# Patient Record
Sex: FEMALE | Race: OTHER | HISPANIC OR LATINO | URBAN - METROPOLITAN AREA
[De-identification: names, ages, dates, MRNs, and addresses within clinical notes are randomized per-mention and may not be internally consistent; named-entity substitution may affect disease eponyms.]

---

## 2020-06-17 ENCOUNTER — OFFICE VISIT (OUTPATIENT)
Dept: URBAN - METROPOLITAN AREA CLINIC 115 | Facility: CLINIC | Age: 65
End: 2020-06-17

## 2020-08-17 ENCOUNTER — TELEPHONE ENCOUNTER (OUTPATIENT)
Dept: URBAN - METROPOLITAN AREA CLINIC 115 | Facility: CLINIC | Age: 65
End: 2020-08-17

## 2020-09-02 ENCOUNTER — OFFICE VISIT (OUTPATIENT)
Dept: URBAN - METROPOLITAN AREA TELEHEALTH 2 | Facility: TELEHEALTH | Age: 65
End: 2020-09-02
Payer: COMMERCIAL

## 2020-09-02 DIAGNOSIS — K26.9 DUODENAL ULCER: ICD-10-CM

## 2020-09-02 DIAGNOSIS — C24.1 AMPULLARY CARCINOMA: ICD-10-CM

## 2020-09-02 DIAGNOSIS — K83.1 AMPULLARY STENOSIS: ICD-10-CM

## 2020-09-02 DIAGNOSIS — R10.13 ABDOMINAL PAIN, EPIGASTRIC: ICD-10-CM

## 2020-09-02 PROCEDURE — G8427 DOCREV CUR MEDS BY ELIG CLIN: HCPCS | Performed by: INTERNAL MEDICINE

## 2020-09-02 PROCEDURE — 1036F TOBACCO NON-USER: CPT | Performed by: INTERNAL MEDICINE

## 2020-09-02 PROCEDURE — G9903 PT SCRN TBCO ID AS NON USER: HCPCS | Performed by: INTERNAL MEDICINE

## 2020-09-02 PROCEDURE — G8420 CALC BMI NORM PARAMETERS: HCPCS | Performed by: INTERNAL MEDICINE

## 2020-09-02 PROCEDURE — 99214 OFFICE O/P EST MOD 30 MIN: CPT | Performed by: INTERNAL MEDICINE

## 2020-09-02 PROCEDURE — 3017F COLORECTAL CA SCREEN DOC REV: CPT | Performed by: INTERNAL MEDICINE

## 2020-09-02 RX ORDER — PANTOPRAZOLE SODIUM 40 MG/1
1 TABLET TABLET, DELAYED RELEASE ORAL ONCE A DAY
Qty: 90 | Refills: 1 | OUTPATIENT
Start: 2020-09-02

## 2020-09-02 RX ORDER — DICYCLOMINE HYDROCHLORIDE 10 MG/1
1 TABLET CAPSULE ORAL THREE TIMES A DAY
Qty: 90 | Refills: 1 | OUTPATIENT
Start: 2020-09-02 | End: 2020-11-01

## 2020-09-02 NOTE — HPI-OTHER HISTORIES
The patient is a 62 year old,  or  female , who presents for follow up for CBD stricture. abdominal pain. A copy of this document will be sent to the referring provider.  The patient has described the pain as moderate and throbbing occurring constantly and after meals, and lasts hours at a time. The pain has remained unchanged since it started. The location of the pain is in the upper abdomen and has been present for the past off and on for 1 week. The pain is improved by no particular treatment and is aggravated by eating. The pain is reproducible with palpation over the area.  Patient has a history of constipation and heartburn.  The patient admits to feeling bloated.       She was admitted at [facility] from 12/29-1/3 for abdominal pain and elevated Lfts, CT scan and MRI showed dilated CBD with out any CBD stones. ERCP showed ampullay strictutre, 10x12 fr stent was placed and brushings showed atypical cells but favor reactive and ulceration of the ampulla. She deneis any fever and chills but c/o abdominal pain with eating ,bloating and c/o weight loss about 10 lbs in past 6 weeks. She c/o pain LLQ worse in the past 2 days .  03/01/2018 EUS done on 2/7/18 and biopsy showed atypical cells of the ampulla. Thickened ampulla noted, suspicious for ampullary cancer.  Patient is feeling slightly better. Admits to constipation. She has having lower torso pain. Feels a burning pain when urinating. Complaining of LLQ pain. Denies jaundice and weight loss. Plastic stent in bile duct.  3/22/2019 She had pylorous preserving whipple procedure by Dr. Keenan on 3/27/2018 for ampullary mass. She had no issues after the procedure, but she constantly feels abdominal pain, for the past 7 months. She complains of constipation, she has a burning sensation inside. States she has a bowel movement every 3rd day. She has been eating, but doesn't gain weight. She has bloating and swelling. She had a CT scan in January. Denies melena or hematochezia. Does complain of weight loss.  5/10/2019 EGD done on 4/9/2019 showed a 2 cm non-bleeding ulcer in duodenal bulb and gastritis in the antrum of the stomach. Biopsy was benign. Colonoscopy done on 4/9/2019 showed internal hemorrhoids. She still complains of abdominal pain, no bloating. She denies any NSAID's use. She is currently taking Protonix. She denies any melena or hematochezia. Symptoms improved, occasional epigastric pain.  8/8/2019 CT scan done on 8/6/2019 was normal, no cancer noted. Labs were normal. Patient states she still has burning sensation in the abdomen. She states abdominal pain improved. She states she never took Bentyl. She is currently on Protonix. She denies any constipation or loose bowels. Denies any melena or hematochezia. Improved, but still has occasional abdominal pain. No weight loss.  10/31/2019 Patient was seen by Dr. 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  Patient complains of occasional abdominal pain. She states she is eating well. Denies any other issues, no jaundice. She has an upcoming CT scan in January. No weight loss. No new symptoms.  03/18/2020 Patient is doing fine, but she is complaining of abdominal pain. She states the pain is more constant now. She had an CT done last week at [facility]. CT showed no signs of ulcer or recurrent malignancy. She complains of constipation. She has bowel movements every three days, along with symptoms of bloating. She also complains of itching. Denies any jaundice.

## 2020-09-02 NOTE — HPI-TODAY'S VISIT:
09/02/2020 Patient was seen for a Telehealth visit. 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 She complains of abdominal pain 2 weeks ago. She denies any constipation. She is having bowel movements every other day. She states abdominal pain improved but it is still present, not as severe as 2 weeks ago. She started pantoprazole and dicyclomine since the last visit. She states dicyclomine helped while she was taking and has been unable to get a refill since March. EGD done on 4/9/2019 showed duodenal bulb ulcer, colonoscopy done on 4/9/2019 showed internal hemorrhoids. CT scan done 08/10/2020 showed lymph nodes around the stomach. She previously had a Whipple's procedure, and underwent surgery 2 years ago. She has seen the oncologist, was adviseed that the masses were small and unable to be biopsied. She had stent placed in the bile duct also. She has an appointment with Dr. Rojo in October, along with a repeat CT scan prior to that office visit.

## 2020-09-08 ENCOUNTER — TELEPHONE ENCOUNTER (OUTPATIENT)
Dept: URBAN - METROPOLITAN AREA CLINIC 115 | Facility: CLINIC | Age: 65
End: 2020-09-08

## 2020-09-22 ENCOUNTER — OFFICE VISIT (OUTPATIENT)
Dept: URBAN - METROPOLITAN AREA MEDICAL CENTER 24 | Facility: MEDICAL CENTER | Age: 65
End: 2020-09-22
Payer: COMMERCIAL

## 2020-09-22 DIAGNOSIS — K29.60 ADENOPAPILLOMATOSIS GASTRICA: ICD-10-CM

## 2020-09-22 DIAGNOSIS — K28.9 ANASTOMOTIC ULCER: ICD-10-CM

## 2020-09-22 DIAGNOSIS — R59.0 ABDOMINAL LYMPHADENOPATHY: ICD-10-CM

## 2020-09-22 PROCEDURE — 43239 EGD BIOPSY SINGLE/MULTIPLE: CPT | Performed by: INTERNAL MEDICINE

## 2020-09-22 PROCEDURE — 43238 EGD US FINE NEEDLE BX/ASPIR: CPT | Performed by: INTERNAL MEDICINE

## 2020-10-15 ENCOUNTER — OFFICE VISIT (OUTPATIENT)
Dept: URBAN - METROPOLITAN AREA CLINIC 115 | Facility: CLINIC | Age: 65
End: 2020-10-15
Payer: COMMERCIAL

## 2020-10-15 ENCOUNTER — WEB ENCOUNTER (OUTPATIENT)
Dept: URBAN - METROPOLITAN AREA CLINIC 115 | Facility: CLINIC | Age: 65
End: 2020-10-15

## 2020-10-15 DIAGNOSIS — K83.1 AMPULLARY STENOSIS: ICD-10-CM

## 2020-10-15 DIAGNOSIS — C24.1 AMPULLARY CARCINOMA: ICD-10-CM

## 2020-10-15 DIAGNOSIS — R10.13 ABDOMINAL PAIN, EPIGASTRIC: ICD-10-CM

## 2020-10-15 DIAGNOSIS — K26.9 DUODENAL ULCER: ICD-10-CM

## 2020-10-15 DIAGNOSIS — K83.8 DILATED CBD, ACQUIRED: ICD-10-CM

## 2020-10-15 PROCEDURE — G8427 DOCREV CUR MEDS BY ELIG CLIN: HCPCS | Performed by: INTERNAL MEDICINE

## 2020-10-15 PROCEDURE — G8420 CALC BMI NORM PARAMETERS: HCPCS | Performed by: INTERNAL MEDICINE

## 2020-10-15 PROCEDURE — 99214 OFFICE O/P EST MOD 30 MIN: CPT | Performed by: INTERNAL MEDICINE

## 2020-10-15 PROCEDURE — 1036F TOBACCO NON-USER: CPT | Performed by: INTERNAL MEDICINE

## 2020-10-15 RX ORDER — PANTOPRAZOLE SODIUM 40 MG/1
1 TABLET TABLET, DELAYED RELEASE ORAL ONCE A DAY
Qty: 90 | Refills: 1 | OUTPATIENT
Start: 2020-10-15

## 2020-10-15 RX ORDER — DICYCLOMINE HYDROCHLORIDE 10 MG/1
1 TABLET CAPSULE ORAL THREE TIMES A DAY
Qty: 90 | Refills: 1 | Status: ACTIVE | COMMUNITY
Start: 2020-09-02 | End: 2020-11-01

## 2020-10-15 RX ORDER — DICYCLOMINE HYDROCHLORIDE 10 MG/1
1 TABLET CAPSULE ORAL THREE TIMES A DAY
Qty: 270 TABLET | Refills: 1 | OUTPATIENT
Start: 2020-10-15 | End: 2021-04-13

## 2020-10-15 RX ORDER — PANTOPRAZOLE SODIUM 40 MG/1
1 TABLET TABLET, DELAYED RELEASE ORAL ONCE A DAY
Qty: 90 | Refills: 1 | Status: ACTIVE | COMMUNITY
Start: 2020-09-02

## 2020-10-15 NOTE — HPI-OTHER HISTORIES
The patient is a 62 year old,  or  female , who presents for follow up for CBD stricture. abdominal pain. A copy of this document will be sent to the referring provider.  The patient has described the pain as moderate and throbbing occurring constantly and after meals, and lasts hours at a time. The pain has remained unchanged since it started. The location of the pain is in the upper abdomen and has been present for the past off and on for 1 week. The pain is improved by no particular treatment and is aggravated by eating. The pain is reproducible with palpation over the area.  Patient has a history of constipation and heartburn.  The patient admits to feeling bloated.       She was admitted at [facility] from 12/29-1/3 for abdominal pain and elevated Lfts, CT scan and MRI showed dilated CBD with out any CBD stones. ERCP showed ampullay strictutre, 10x12 fr stent was placed and brushings showed atypical cells but favor reactive and ulceration of the ampulla. She deneis any fever and chills but c/o abdominal pain with eating ,bloating and c/o weight loss about 10 lbs in past 6 weeks. She c/o pain LLQ worse in the past 2 days .  03/01/2018 EUS done on 2/7/18 and biopsy showed atypical cells of the ampulla. Thickened ampulla noted, suspicious for ampullary cancer.  Patient is feeling slightly better. Admits to constipation. She has having lower torso pain. Feels a burning pain when urinating. Complaining of LLQ pain. Denies jaundice and weight loss. Plastic stent in bile duct.  3/22/2019 She had pylorous preserving whipple procedure by Dr. Keenan on 3/27/2018 for ampullary mass. She had no issues after the procedure, but she constantly feels abdominal pain, for the past 7 months. She complains of constipation, she has a burning sensation inside. States she has a bowel movement every 3rd day. She has been eating, but doesn't gain weight. She has bloating and swelling. She had a CT scan in January. Denies melena or hematochezia. Does complain of weight loss.  5/10/2019 EGD done on 4/9/2019 showed a 2 cm non-bleeding ulcer in duodenal bulb and gastritis in the antrum of the stomach. Biopsy was benign. Colonoscopy done on 4/9/2019 showed internal hemorrhoids. She still complains of abdominal pain, no bloating. She denies any NSAID's use. She is currently taking Protonix. She denies any melena or hematochezia. Symptoms improved, occasional epigastric pain.  8/8/2019 CT scan done on 8/6/2019 was normal, no cancer noted. Labs were normal. Patient states she still has burning sensation in the abdomen. She states abdominal pain improved. She states she never took Bentyl. She is currently on Protonix. She denies any constipation or loose bowels. Denies any melena or hematochezia. Improved, but still has occasional abdominal pain. No weight loss.  10/31/2019 Patient was seen by Dr. 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  Patient complains of occasional abdominal pain. She states she is eating well. Denies any other issues, no jaundice. She has an upcoming CT scan in January. No weight loss. No new symptoms.  03/18/2020 Patient is doing fine, but she is complaining of abdominal pain. She states the pain is more constant now. She had an CT done last week at [facility]. CT showed no signs of ulcer or recurrent malignancy. She complains of constipation. She has bowel movements every three days, along with symptoms of bloating. She also complains of itching. Denies any jaundice.

## 2020-12-10 ENCOUNTER — OFFICE VISIT (OUTPATIENT)
Dept: URBAN - METROPOLITAN AREA CLINIC 115 | Facility: CLINIC | Age: 65
End: 2020-12-10

## 2020-12-10 RX ORDER — DICYCLOMINE HYDROCHLORIDE 10 MG/1
1 TABLET CAPSULE ORAL THREE TIMES A DAY
Qty: 270 TABLET | Refills: 1 | COMMUNITY
Start: 2020-10-15 | End: 2021-04-13

## 2020-12-10 RX ORDER — PANTOPRAZOLE SODIUM 40 MG/1
1 TABLET TABLET, DELAYED RELEASE ORAL ONCE A DAY
Qty: 90 | Refills: 1 | COMMUNITY
Start: 2020-09-02

## 2020-12-10 RX ORDER — PANTOPRAZOLE SODIUM 40 MG/1
1 TABLET TABLET, DELAYED RELEASE ORAL ONCE A DAY
Qty: 90 | Refills: 1 | COMMUNITY
Start: 2020-10-15

## 2021-01-07 ENCOUNTER — OFFICE VISIT (OUTPATIENT)
Dept: URBAN - METROPOLITAN AREA CLINIC 115 | Facility: CLINIC | Age: 66
End: 2021-01-07

## 2021-01-21 ENCOUNTER — OFFICE VISIT (OUTPATIENT)
Dept: URBAN - METROPOLITAN AREA CLINIC 115 | Facility: CLINIC | Age: 66
End: 2021-01-21
Payer: COMMERCIAL

## 2021-01-21 ENCOUNTER — OFFICE VISIT (OUTPATIENT)
Dept: URBAN - METROPOLITAN AREA CLINIC 115 | Facility: CLINIC | Age: 66
End: 2021-01-21

## 2021-01-21 ENCOUNTER — DASHBOARD ENCOUNTERS (OUTPATIENT)
Age: 66
End: 2021-01-21

## 2021-01-21 DIAGNOSIS — R10.13 ABDOMINAL PAIN, EPIGASTRIC: ICD-10-CM

## 2021-01-21 DIAGNOSIS — C24.1 AMPULLARY CARCINOMA: ICD-10-CM

## 2021-01-21 DIAGNOSIS — K83.1 AMPULLARY STENOSIS: ICD-10-CM

## 2021-01-21 DIAGNOSIS — K83.8 DILATED CBD, ACQUIRED: ICD-10-CM

## 2021-01-21 DIAGNOSIS — K26.9 DUODENAL ULCER: ICD-10-CM

## 2021-01-21 DIAGNOSIS — R93.89 ABNORMAL CT SCAN: ICD-10-CM

## 2021-01-21 PROBLEM — 30144000 OBSTRUCTION OF BILE DUCT: Status: ACTIVE | Noted: 2020-09-02

## 2021-01-21 PROBLEM — 129679001: Status: ACTIVE | Noted: 2021-01-21

## 2021-01-21 PROCEDURE — 99214 OFFICE O/P EST MOD 30 MIN: CPT | Performed by: INTERNAL MEDICINE

## 2021-01-21 PROCEDURE — G8418 CALC BMI BLW LOW PARAM F/U: HCPCS | Performed by: INTERNAL MEDICINE

## 2021-01-21 PROCEDURE — G8427 DOCREV CUR MEDS BY ELIG CLIN: HCPCS | Performed by: INTERNAL MEDICINE

## 2021-01-21 PROCEDURE — G9903 PT SCRN TBCO ID AS NON USER: HCPCS | Performed by: INTERNAL MEDICINE

## 2021-01-21 RX ORDER — PANTOPRAZOLE SODIUM 40 MG/1
1 TABLET TABLET, DELAYED RELEASE ORAL ONCE A DAY
Qty: 90 | Refills: 1 | COMMUNITY
Start: 2020-09-02

## 2021-01-21 RX ORDER — DICYCLOMINE HYDROCHLORIDE 10 MG/1
1 TABLET CAPSULE ORAL THREE TIMES A DAY
Qty: 270 TABLET | Refills: 1 | COMMUNITY
Start: 2020-10-15 | End: 2021-04-13

## 2021-01-21 RX ORDER — PANTOPRAZOLE SODIUM 40 MG/1
1 TABLET TABLET, DELAYED RELEASE ORAL ONCE A DAY
Qty: 90 | Refills: 1 | COMMUNITY
Start: 2020-10-15

## 2021-01-21 NOTE — HPI-OTHER HISTORIES
The patient is a 62 year old,  or  female , who presents for follow up for CBD stricture. abdominal pain. A copy of this document will be sent to the referring provider.  The patient has described the pain as moderate and throbbing occurring constantly and after meals, and lasts hours at a time. The pain has remained unchanged since it started. The location of the pain is in the upper abdomen and has been present for the past off and on for 1 week. The pain is improved by no particular treatment and is aggravated by eating. The pain is reproducible with palpation over the area.  Patient has a history of constipation and heartburn.  The patient admits to feeling bloated.       She was admitted at [facility] from 12/29-1/3 for abdominal pain and elevated Lfts, CT scan and MRI showed dilated CBD with out any CBD stones. ERCP showed ampullay strictutre, 10x12 fr stent was placed and brushings showed atypical cells but favor reactive and ulceration of the ampulla. She deneis any fever and chills but c/o abdominal pain with eating ,bloating and c/o weight loss about 10 lbs in past 6 weeks. She c/o pain LLQ worse in the past 2 days .  03/01/2018 EUS done on 2/7/18 and biopsy showed atypical cells of the ampulla. Thickened ampulla noted, suspicious for ampullary cancer.  Patient is feeling slightly better. Admits to constipation. She has having lower torso pain. Feels a burning pain when urinating. Complaining of LLQ pain. Denies jaundice and weight loss. Plastic stent in bile duct.  3/22/2019 She had pylorous preserving whipple procedure by Dr. Keenan on 3/27/2018 for ampullary mass. She had no issues after the procedure, but she constantly feels abdominal pain, for the past 7 months. She complains of constipation, she has a burning sensation inside. States she has a bowel movement every 3rd day. She has been eating, but doesn't gain weight. She has bloating and swelling. She had a CT scan in January. Denies melena or hematochezia. Does complain of weight loss.  5/10/2019 EGD done on 4/9/2019 showed a 2 cm non-bleeding ulcer in duodenal bulb and gastritis in the antrum of the stomach. Biopsy was benign. Colonoscopy done on 4/9/2019 showed internal hemorrhoids. She still complains of abdominal pain, no bloating. She denies any NSAID's use. She is currently taking Protonix. She denies any melena or hematochezia. Symptoms improved, occasional epigastric pain.  8/8/2019 CT scan done on 8/6/2019 was normal, no cancer noted. Labs were normal. Patient states she still has burning sensation in the abdomen. She states abdominal pain improved. She states she never took Bentyl. She is currently on Protonix. She denies any constipation or loose bowels. Denies any melena or hematochezia. Improved, but still has occasional abdominal pain. No weight loss.  10/31/2019 Patient was seen by Dr. 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  Patient complains of occasional abdominal pain. She states she is eating well. Denies any other issues, no jaundice. She has an upcoming CT scan in January. No weight loss. No new symptoms.  03/18/2020 Patient is doing fine, but she is complaining of abdominal pain. She states the pain is more constant now. She had an CT done last week at [facility] [place]. CT showed no signs of ulcer or recurrent malignancy. She complains of constipation. She has bowel movements every three days, along with symptoms of bloating. She also complains of itching. Denies any jaundice.

## 2021-01-21 NOTE — HPI-TODAY'S VISIT:
09/02/2020 Patient was seen for a Telehealth visit. 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 She complains of abdominal pain 2 weeks ago. She denies any constipation. She is having bowel movements every other day. She states abdominal pain improved but it is still present, not as severe as 2 weeks ago. She started pantoprazole and dicyclomine since the last visit. She states dicyclomine helped while she was taking and has been unable to get a refill since March. EGD done on 4/9/2019 showed duodenal bulb ulcer, colonoscopy done on 4/9/2019 showed internal hemorrhoids. CT scan done 08/10/2020 showed lymph nodes around the stomach. She previously had a Whipple's procedure, and underwent surgery 2 years ago. She has seen the oncologist, was adviseed that the masses were small and unable to be biopsied. She had stent placed in the bile duct also. She has an appointment with Dr. Rojo in October, along with a repeat CT scan prior to that office visit.  10/15/2020 Patient presents for a follow up office visit. Patient states that she feels better with the medication. She will have CT scan done on Monday. Patient is concerned that ulcers will come back. EUS/EGD on 09/22/2020 showed 1 cm cratered ulcer. Stomach biopsy showed gastritis, benign no cancer. Denies any melena or hematochezia.   01/21/2021  Patient presents for f/u office visit. EGD on 09/22/2020 showed anastomotic ulcer. Biopsies were benign.  CT scan of abdomen on 09/02/2020 showed disease progression and enlarged lymph nodes in lungs and lung metastasis. EUS on 09/22/2020 showed anastomotic ulcer and lymph nodes in the celiac axis. Celiac axis lymph nodes were negative for malignancy on biopsies.   She is currently not undergoing chemotherapy, she was recommended that it was not necessary after her surgery. She had another recent abdominal CT scan with Dr. Garcia at [facility] [place] for LUQ and left sided pain done last week, results showing disease progressing. She still takes omeprazole. Her stomach pain has decreased over time, but she is still experiencing burning and discomfort when she eats. She previously took dicyclomine which improved her symptoms but she has since run out. No constipation, improved from before. She feels some pain and bloating in the epigastric area and left side.

## 2021-08-25 ENCOUNTER — OUT OF OFFICE VISIT (OUTPATIENT)
Dept: URBAN - METROPOLITAN AREA MEDICAL CENTER 24 | Facility: MEDICAL CENTER | Age: 66
End: 2021-08-25
Payer: COMMERCIAL

## 2021-08-25 DIAGNOSIS — K28.9 ANASTOMOTIC ULCER: ICD-10-CM

## 2021-08-25 DIAGNOSIS — K28.4 CHRONIC OR UNSPECIFIED GASTROJEJUNAL ULCER WITH HEMORRHAGE: ICD-10-CM

## 2021-08-25 DIAGNOSIS — C24.1 AMPULLARY CARCINOMA: ICD-10-CM

## 2021-08-25 DIAGNOSIS — K92.1 ACUTE MELENA: ICD-10-CM

## 2021-08-25 DIAGNOSIS — K92.0 BLOODY EMESIS: ICD-10-CM

## 2021-08-25 PROCEDURE — G8427 DOCREV CUR MEDS BY ELIG CLIN: HCPCS | Performed by: INTERNAL MEDICINE

## 2021-08-25 PROCEDURE — 99232 SBSQ HOSP IP/OBS MODERATE 35: CPT | Performed by: INTERNAL MEDICINE

## 2021-08-25 PROCEDURE — 99222 1ST HOSP IP/OBS MODERATE 55: CPT | Performed by: INTERNAL MEDICINE

## 2021-08-25 PROCEDURE — 43255 EGD CONTROL BLEEDING ANY: CPT | Performed by: INTERNAL MEDICINE

## 2021-08-26 ENCOUNTER — OFFICE VISIT (OUTPATIENT)
Dept: URBAN - METROPOLITAN AREA CLINIC 82 | Facility: CLINIC | Age: 66
End: 2021-08-26

## 2021-08-27 ENCOUNTER — OUT OF OFFICE VISIT (OUTPATIENT)
Dept: URBAN - METROPOLITAN AREA MEDICAL CENTER 24 | Facility: MEDICAL CENTER | Age: 66
End: 2021-08-27
Payer: COMMERCIAL

## 2021-08-27 DIAGNOSIS — D62 ACUTE BLOOD LOSS ANEMIA: ICD-10-CM

## 2021-08-27 DIAGNOSIS — I95.9 HYPOTENSION: ICD-10-CM

## 2021-08-27 DIAGNOSIS — K92.1 ACUTE MELENA: ICD-10-CM

## 2021-08-27 PROCEDURE — 99232 SBSQ HOSP IP/OBS MODERATE 35: CPT | Performed by: INTERNAL MEDICINE

## 2021-08-28 ENCOUNTER — OUT OF OFFICE VISIT (OUTPATIENT)
Dept: URBAN - METROPOLITAN AREA MEDICAL CENTER 24 | Facility: MEDICAL CENTER | Age: 66
End: 2021-08-28
Payer: COMMERCIAL

## 2021-08-28 DIAGNOSIS — K28.9 ANASTOMOTIC ULCER: ICD-10-CM

## 2021-08-28 DIAGNOSIS — K92.0 BLOODY EMESIS: ICD-10-CM

## 2021-08-28 DIAGNOSIS — C24.1 AMPULLARY CARCINOMA: ICD-10-CM

## 2021-08-28 DIAGNOSIS — K92.1 ACUTE MELENA: ICD-10-CM

## 2021-08-28 PROCEDURE — 99232 SBSQ HOSP IP/OBS MODERATE 35: CPT | Performed by: INTERNAL MEDICINE

## 2021-08-31 ENCOUNTER — OUT OF OFFICE VISIT (OUTPATIENT)
Dept: URBAN - METROPOLITAN AREA MEDICAL CENTER 24 | Facility: MEDICAL CENTER | Age: 66
End: 2021-08-31
Payer: COMMERCIAL

## 2021-08-31 DIAGNOSIS — K92.1 ACUTE MELENA: ICD-10-CM

## 2021-08-31 DIAGNOSIS — C24.1 CANCER OF AMPULLA OF VATER: ICD-10-CM

## 2021-08-31 DIAGNOSIS — K92.0 BLOODY EMESIS: ICD-10-CM

## 2021-08-31 DIAGNOSIS — K28.9 ANASTOMOTIC ULCER: ICD-10-CM

## 2021-08-31 PROCEDURE — 99232 SBSQ HOSP IP/OBS MODERATE 35: CPT | Performed by: INTERNAL MEDICINE

## 2021-09-14 NOTE — HPI-TODAY'S VISIT:
09/02/2020 Patient was seen for a Telehealth visit. 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 She complains of abdominal pain 2 weeks ago. She denies any constipation. She is having bowel movements every other day. She states abdominal pain improved but it is still present, not as severe as 2 weeks ago. She started pantoprazole and dicyclomine since the last visit. She states dicyclomine helped while she was taking and has been unable to get a refill since March. EGD done on 4/9/2019 showed duodenal bulb ulcer, colonoscopy done on 4/9/2019 showed internal hemorrhoids. CT scan done 08/10/2020 showed lymph nodes around the stomach. She previously had a Whipple's procedure, and underwent surgery 2 years ago. She has seen the oncologist, was adviseed that the masses were small and unable to be biopsied. She had stent placed in the bile duct also. She has an appointment with Dr. Rojo in October, along with a repeat CT scan prior to that office visit.  10/15/2020 Patient presents for a follow up office visit. Patient states that she feels better with the medication. She will have CT scan done on Monday. Patient is concerned that ulcers will come back. EUS/EGD on 09/22/2020 showed 1 cm cratered ulcer. Stomach biopsy showed gastritis, benign no cancer. Denies any melena or hematochezia.
No

## 2021-09-29 ENCOUNTER — OUT OF OFFICE VISIT (OUTPATIENT)
Dept: URBAN - METROPOLITAN AREA MEDICAL CENTER 24 | Facility: MEDICAL CENTER | Age: 66
End: 2021-09-29
Payer: COMMERCIAL

## 2021-09-29 DIAGNOSIS — C24.1 AMPULLARY CARCINOMA: ICD-10-CM

## 2021-09-29 DIAGNOSIS — K28.9 GASTROJEJUNAL ULCER: ICD-10-CM

## 2021-09-29 DIAGNOSIS — D62 ACUTE BLOOD LOSS ANEMIA: ICD-10-CM

## 2021-09-29 DIAGNOSIS — R10.84 ABDOMINAL CRAMPING, GENERALIZED: ICD-10-CM

## 2021-09-29 DIAGNOSIS — K92.2 ACUTE GASTROINTESTINAL BLEEDING: ICD-10-CM

## 2021-09-29 DIAGNOSIS — K28.9 ANASTOMOTIC ULCER: ICD-10-CM

## 2021-09-29 PROCEDURE — G8427 DOCREV CUR MEDS BY ELIG CLIN: HCPCS | Performed by: INTERNAL MEDICINE

## 2021-09-29 PROCEDURE — 99232 SBSQ HOSP IP/OBS MODERATE 35: CPT | Performed by: INTERNAL MEDICINE

## 2021-09-29 PROCEDURE — 99223 1ST HOSP IP/OBS HIGH 75: CPT | Performed by: INTERNAL MEDICINE

## 2021-09-29 PROCEDURE — 43235 EGD DIAGNOSTIC BRUSH WASH: CPT | Performed by: INTERNAL MEDICINE
